# Patient Record
Sex: FEMALE | Race: WHITE | Employment: FULL TIME | URBAN - METROPOLITAN AREA
[De-identification: names, ages, dates, MRNs, and addresses within clinical notes are randomized per-mention and may not be internally consistent; named-entity substitution may affect disease eponyms.]

---

## 2017-08-11 ENCOUNTER — OFFICE VISIT (OUTPATIENT)
Dept: INTERNAL MEDICINE CLINIC | Age: 32
End: 2017-08-11

## 2017-08-11 VITALS
RESPIRATION RATE: 18 BRPM | DIASTOLIC BLOOD PRESSURE: 76 MMHG | SYSTOLIC BLOOD PRESSURE: 116 MMHG | BODY MASS INDEX: 26.1 KG/M2 | OXYGEN SATURATION: 100 % | WEIGHT: 176.2 LBS | HEIGHT: 69 IN | TEMPERATURE: 98.2 F | HEART RATE: 83 BPM

## 2017-08-11 DIAGNOSIS — R05.3 CHRONIC COUGH: Primary | ICD-10-CM

## 2017-08-11 DIAGNOSIS — J30.9 ALLERGIC RHINITIS, UNSPECIFIED ALLERGIC RHINITIS TRIGGER, UNSPECIFIED RHINITIS SEASONALITY: ICD-10-CM

## 2017-08-11 DIAGNOSIS — J45.909 ASTHMA, CURRENTLY INACTIVE: ICD-10-CM

## 2017-08-11 RX ORDER — BENZONATATE 200 MG/1
200 CAPSULE ORAL
Qty: 30 CAP | Refills: 1 | Status: SHIPPED | OUTPATIENT
Start: 2017-08-11 | End: 2020-07-06

## 2017-08-11 RX ORDER — ALBUTEROL SULFATE 90 UG/1
AEROSOL, METERED RESPIRATORY (INHALATION)
Qty: 8.5 INHALER | Refills: 2 | Status: SHIPPED | OUTPATIENT
Start: 2017-08-11 | End: 2017-10-01 | Stop reason: SDUPTHER

## 2017-08-11 RX ORDER — PREDNISONE 20 MG/1
40 TABLET ORAL DAILY
Qty: 10 TAB | Refills: 0 | Status: SHIPPED | OUTPATIENT
Start: 2017-08-11 | End: 2020-07-06

## 2017-08-11 RX ORDER — FLUTICASONE PROPIONATE 50 MCG
2 SPRAY, SUSPENSION (ML) NASAL DAILY
Qty: 3 BOTTLE | Refills: 1 | Status: SHIPPED | OUTPATIENT
Start: 2017-08-11 | End: 2020-07-06

## 2017-08-11 RX ORDER — FLUTICASONE PROPIONATE AND SALMETEROL 250; 50 UG/1; UG/1
1 POWDER RESPIRATORY (INHALATION) 2 TIMES DAILY
Qty: 1 INHALER | Refills: 5 | Status: SHIPPED | OUTPATIENT
Start: 2017-08-11 | End: 2017-08-22 | Stop reason: CLARIF

## 2017-08-11 RX ORDER — AZITHROMYCIN 250 MG/1
TABLET, FILM COATED ORAL
Qty: 6 TAB | Refills: 0 | Status: SHIPPED | OUTPATIENT
Start: 2017-08-11 | End: 2017-08-16

## 2017-08-11 RX ORDER — AZELASTINE 1 MG/ML
2 SPRAY, METERED NASAL 2 TIMES DAILY
Qty: 3 BOTTLE | Refills: 1 | Status: SHIPPED | OUTPATIENT
Start: 2017-08-11 | End: 2020-07-06

## 2017-08-11 NOTE — PROGRESS NOTES
HISTORY OF PRESENT ILLNESS  Cici Diggs is a 29 y.o. female. HPI  Presents for f/u chronic cough    Friend moved in with a cat  Known cat allergy    ?mold in the home as well    Recurrent cough  +spells  +clear drainage     Using astelin and allegra    Intermittent use of albuterol - at least daily    Past medical, Social, and Family history reviewed  Medications reviewed and updated. ROS  Complete ROS reviewed and negative or stable except as noted in HPI. Physical Exam   Constitutional: She is oriented to person, place, and time. She appears well-nourished. No distress. HENT:   Head: Normocephalic and atraumatic. Audible congestion   Eyes: EOM are normal. Pupils are equal, round, and reactive to light. No scleral icterus. Neck: Normal range of motion. Neck supple. Cardiovascular: Normal rate, regular rhythm and normal heart sounds. Exam reveals no gallop and no friction rub. No murmur heard. Pulmonary/Chest: Effort normal. No respiratory distress. She has wheezes (scattered). She has no rales. Abdominal: Soft. She exhibits no distension. There is no tenderness. Musculoskeletal: Normal range of motion. She exhibits no edema. Neurological: She is alert and oriented to person, place, and time. She exhibits normal muscle tone. Skin: Skin is warm. No rash noted. Psychiatric: She has a normal mood and affect. Nursing note and vitals reviewed. Prior labs reviewed. ASSESSMENT and PLAN    ICD-10-CM ICD-9-CM    1. Chronic cough R05 786.2 azithromycin (ZITHROMAX) 250 mg tablet      benzonatate (TESSALON) 200 mg capsule   2. Asthma, currently inactive J45.909 493.90 predniSONE (DELTASONE) 20 mg tablet      albuterol (VENTOLIN HFA) 90 mcg/actuation inhaler      DISCONTINUED: fluticasone-salmeterol (ADVAIR) 250-50 mcg/dose diskus inhaler   3.  Allergic rhinitis, unspecified allergic rhinitis trigger, unspecified rhinitis seasonality J30.9 477.9 azelastine (ASTELIN) 137 mcg (0.1 %) nasal spray      fluticasone (FLONASE ALLERGY RELIEF) 50 mcg/actuation nasal spray     Follow-up Disposition:  Return in about 2 months (around 10/11/2017), or if symptoms worsen or fail to improve, for asthma. results and schedule of future studies reviewed with patient  reviewed diet, exercise and weight    reviewed medications and side effects in detail   azithro  pred burst  Resume advair  Encourage flonase  Cont allegra and astelin  Consider resume singulair  Consider remove cat exposure, have mold evaluated.

## 2017-08-11 NOTE — MR AVS SNAPSHOT
Visit Information Date & Time Provider Department Dept. Phone Encounter #  
 8/11/2017  8:45 AM Ijeoma Mead, [address] [place] [address] and Internal Medicine [phone] [id number] Follow-up Instructions Return in about 2 months (around 10/11/2017), or if symptoms worsen or fail to improve, for asthma. Upcoming Health Maintenance Date Due Pneumococcal 19-64 Medium Risk (1 of 1 - PPSV23) 4/21/2006 DTaP/Tdap/Td series (1 - Tdap) 4/21/2008 INFLUENZA AGE 9 TO ADULT 8/1/2017 PAP AKA CERVICAL CYTOLOGY 3/19/2018 Allergies as of 8/11/2017  Review Complete On: 8/11/2017 By: Ijeoma Mead MD  
  
 Severity Noted Reaction Type Reactions Cat Dander  08/11/2017    Cough Percocet [Oxycodone-acetaminophen]  07/06/2009    Nausea and Vomiting Current Immunizations  Reviewed on 8/11/2017 Name Date Influenza Vaccine (Quad) PF 10/31/2016 Influenza Vaccine Split 11/1/2012 Reviewed by Ijeoma Mead MD on 8/11/2017 at  9:45 AM  
You Were Diagnosed With   
  
 Codes Comments Chronic cough    -  Primary ICD-10-CM: F86 ICD-9-CM: 786.2 Asthma, currently inactive     ICD-10-CM: J45.909 ICD-9-CM: 493.90 Allergic rhinitis, unspecified allergic rhinitis trigger, unspecified rhinitis seasonality     ICD-10-CM: J30.9 ICD-9-CM: 477.9 Vitals BP Pulse Temp Resp Height(growth percentile) Weight(growth percentile) 116/76 (BP 1 Location: Right arm, BP Patient Position: Sitting) 83 98.2 °F (36.8 °C) (Oral) 18 5' 8.75\" (1.746 m) 176 lb 3.2 oz (79.9 kg) SpO2 BMI OB Status Smoking Status 100% 26.21 kg/m2 IUD Current Every Day Smoker BMI and BSA Data Body Mass Index Body Surface Area  
 26.21 kg/m 2 1.97 m 2 Preferred Pharmacy Pharmacy Name Phone [id number] [phone] Your Updated Medication List  
  
   
 This list is accurate as of: 8/11/17 10:01 AM.  Always use your most recent med list.  
  
  
  
  
 ADDERALL 30 mg tablet Generic drug:  dextroamphetamine-amphetamine Take 30 mg by mouth two (2) times a day. albuterol 90 mcg/actuation inhaler Commonly known as:  VENTOLIN HFA  
TAKE 2 PUFFS BY INHALATION EVERY FOUR (4) HOURS AS NEEDED FOR WHEEZING OR SHORTNESS OF BREATH.  
  
 azelastine 137 mcg (0.1 %) nasal spray Commonly known as:  ASTELIN  
2 Sprays by Both Nostrils route two (2) times a day. azithromycin 250 mg tablet Commonly known as:  Aelyda Trini Take 2 tablets today, then take 1 tablet daily  
  
 benzonatate 200 mg capsule Commonly known as:  TESSALON Take 1 Cap by mouth three (3) times daily as needed for Cough. clonazePAM 1 mg tablet Commonly known as:  KlonoPIN  
  
 fexofenadine 180 mg tablet Commonly known as:  [place] Chick TAKE 1 TAB BY MOUTH DAILY. fluticasone 50 mcg/actuation nasal spray Commonly known as:  FLONASE ALLERGY RELIEF  
2 Sprays by Both Nostrils route daily. fluticasone-salmeterol 250-50 mcg/dose diskus inhaler Commonly known as:  ADVAIR Take 1 Puff by inhalation two (2) times a day. montelukast 10 mg tablet Commonly known as:  SINGULAIR  
TAKE 1 TABLET BY MOUTH EVERY DAY  
  
 predniSONE 20 mg tablet Commonly known as:  Pema Look Take 2 Tabs by mouth daily. valACYclovir 500 mg tablet Commonly known as:  VALTREX  
TAKE 2 TABLETS EVERY 24 HRS, AS NEEDED FOR OUTBREAKS ORALLY 5 DAYS Prescriptions Sent to Pharmacy Refills  
 predniSONE (DELTASONE) 20 mg tablet 0 Sig: Take 2 Tabs by mouth daily. Class: Normal  
 Pharmacy: [id number]- [place], [facility] Gely Bowers Ph #: [phone] Route: Oral  
 fluticasone-salmeterol (ADVAIR) 250-50 mcg/dose diskus inhaler 5 Sig: Take 1 Puff by inhalation two (2) times a day.   
 Class: Normal  
 [id number] [address] Ph #: [phone] Route: Inhalation  
 azithromycin (ZITHROMAX) 250 mg tablet 0 Sig: Take 2 tablets today, then take 1 tablet daily Class: Normal  
 [id number] Ph #: [phone]  
 albuterol (VENTOLIN HFA) 90 mcg/actuation inhaler 2 Sig: TAKE 2 PUFFS BY INHALATION EVERY FOUR (4) HOURS AS NEEDED FOR WHEEZING OR SHORTNESS OF BREATH. Class: Normal  
 [id number] Ph #: [phone]  
 azelastine (ASTELIN) 137 mcg (0.1 %) nasal spray 1 Si Sprays by Both Nostrils route two (2) times a day. Class: Normal  
 [id number] Ph #: [phone] Route: Both Nostrils  
 fluticasone (FLONASE ALLERGY RELIEF) 50 mcg/actuation nasal spray 1 Si Sprays by Both Nostrils route daily. Class: Normal  
 [id number] Ph #: [phone] Route: Both Nostrils  
 benzonatate (TESSALON) 200 mg capsule 1 Sig: Take 1 Cap by mouth three (3) times daily as needed for Cough. Class: Normal  
 [id number] Ph #: [phone] Route: Oral  
  
Follow-up Instructions Return in about 2 months (around 10/11/2017), or if symptoms worsen or fail to improve, for asthma. Introducing [facility]! Mani Zimmer introduces [employer] patient portal. Now you can access parts of your medical record, email your doctor's office, and request medication refills online. 1. In your internet browser, go to [email or web address] 2. Click on the First Time User? Click Here link in the Sign In box.  You will see the New Member Sign Up page. 3. Enter your TRData Access Code exactly as it appears below. You will not need to use this code after youve completed the sign-up process. If you do not sign up before the expiration date, you must request a new code. · TRData Access Code: [email or web address] Expires: 11/9/2017  8:38 AM 
 
4. Enter the last four digits of your Social Security Number (xxxx) and Date of Birth (mm/dd/yyyy) as indicated and click Submit. You will be taken to the next sign-up page. 5. Create a TRData ID. This will be your TRData login ID and cannot be changed, so think of one that is secure and easy to remember. 6. Create a TRData password. You can change your password at any time. 7. Enter your Password Reset Question and Answer. This can be used at a later time if you forget your password. 8. Enter your e-mail address. You will receive e-mail notification when new information is available in [address] [facility] 9. Click Sign Up. You can now view and download portions of your medical record. 10. Click the Download Summary menu link to download a portable copy of your medical information. If you have questions, please visit the Frequently Asked Questions section of the TRData website. Remember, TRData is NOT to be used for urgent needs. For medical emergencies, dial 911. Now available from your iPhone and Android! Please provide this summary of care documentation to your next provider. Your primary care clinician is listed as [address]. If you have any questions after today's visit, please call [phone].

## 2017-08-11 NOTE — PROGRESS NOTES
Rm 13    Chief Complaint   Patient presents with    Cough     ongoing for 2 months, \"feels like throat closes up\"    Asthma     inhaler check     1. Have you been to the ER, urgent care clinic since your last visit? Hospitalized since your last visit? No    2. Have you seen or consulted any other health care providers outside of the [address] [place] [address] since your last visit? Include any pap smears or colon screening.  No     Health Maintenance Due   Topic Date Due    Pneumococcal 19-64 Medium Risk (1 of 1 - PPSV23) 04/21/2006    DTaP/Tdap/Td series (1 - Tdap) 04/21/2008    INFLUENZA AGE 9 TO ADULT  08/01/2017     PHQ over the last two weeks 8/11/2017   Little interest or pleasure in doing things Not at all   Feeling down, depressed or hopeless Not at all   Total Score PHQ 2 0

## 2017-08-16 ENCOUNTER — TELEPHONE (OUTPATIENT)
Dept: INTERNAL MEDICINE CLINIC | Age: 32
End: 2017-08-16

## 2017-08-16 NOTE — TELEPHONE ENCOUNTER
Pharmacy is calling to check the status on a prior authorization for Advair 250/50 MG.   Thank you,  Kathryn Downey

## 2017-08-21 ENCOUNTER — DOCUMENTATION ONLY (OUTPATIENT)
Dept: INTERNAL MEDICINE CLINIC | Age: 32
End: 2017-08-21

## 2017-08-22 DIAGNOSIS — J45.40 MODERATE PERSISTENT ASTHMA WITHOUT COMPLICATION: Primary | ICD-10-CM

## 2017-09-19 NOTE — TELEPHONE ENCOUNTER
Fax received from [facility] stating advair is no longer covered and pt will need to be prescribed dulera or breo ellipta instead.

## 2017-10-01 DIAGNOSIS — J45.909 ASTHMA, CURRENTLY INACTIVE: ICD-10-CM

## 2017-10-02 RX ORDER — ALBUTEROL SULFATE 90 UG/1
AEROSOL, METERED RESPIRATORY (INHALATION)
Qty: 18 INHALER | Refills: 2 | Status: SHIPPED | OUTPATIENT
Start: 2017-10-02 | End: 2020-06-15 | Stop reason: SDUPTHER

## 2017-11-11 DIAGNOSIS — J30.89 PERENNIAL ALLERGIC RHINITIS: ICD-10-CM

## 2017-11-11 DIAGNOSIS — J45.40 MODERATE PERSISTENT ASTHMA WITHOUT COMPLICATION: ICD-10-CM

## 2017-11-13 RX ORDER — MONTELUKAST SODIUM 10 MG/1
TABLET ORAL
Qty: 90 TAB | Refills: 1 | Status: SHIPPED | OUTPATIENT
Start: 2017-11-13 | End: 2020-07-06

## 2017-11-13 RX ORDER — MINERAL OIL
ENEMA (ML) RECTAL
Qty: 90 TAB | Refills: 1 | Status: SHIPPED | OUTPATIENT
Start: 2017-11-13 | End: 2020-07-06

## 2018-11-19 DIAGNOSIS — J45.40 MODERATE PERSISTENT ASTHMA WITHOUT COMPLICATION: ICD-10-CM

## 2018-11-19 RX ORDER — FLUTICASONE FUROATE 200 UG/1
POWDER RESPIRATORY (INHALATION)
Qty: 1 INHALER | Refills: 1 | Status: SHIPPED | OUTPATIENT
Start: 2018-11-19 | End: 2020-06-15 | Stop reason: SDUPTHER

## 2020-06-15 ENCOUNTER — OFFICE VISIT (OUTPATIENT)
Dept: INTERNAL MEDICINE CLINIC | Age: 35
End: 2020-06-15

## 2020-06-15 DIAGNOSIS — J45.909 ASTHMA, CURRENTLY INACTIVE: ICD-10-CM

## 2020-06-15 DIAGNOSIS — J45.40 MODERATE PERSISTENT ASTHMA WITHOUT COMPLICATION: ICD-10-CM

## 2020-06-15 NOTE — TELEPHONE ENCOUNTER
Pt was scheduled to see  as an IO visit however she got called into work. Pt has re-scheduled to see  on 7/6/20 however the pt is completely out of her rescue inhaler and would like if  could fill it since she is coming in July.

## 2020-06-22 RX ORDER — FLUTICASONE FUROATE 200 UG/1
POWDER RESPIRATORY (INHALATION)
Qty: 1 INHALER | Refills: 1 | Status: SHIPPED | OUTPATIENT
Start: 2020-06-22 | End: 2020-07-06

## 2020-06-22 RX ORDER — ALBUTEROL SULFATE 90 UG/1
AEROSOL, METERED RESPIRATORY (INHALATION)
Qty: 1 INHALER | Refills: 0 | Status: SHIPPED | OUTPATIENT
Start: 2020-06-22 | End: 2020-07-06 | Stop reason: SDUPTHER

## 2020-06-22 RX ORDER — ALBUTEROL SULFATE 90 UG/1
AEROSOL, METERED RESPIRATORY (INHALATION)
Qty: 1 INHALER | Refills: 2 | Status: SHIPPED | OUTPATIENT
Start: 2020-06-22 | End: 2020-06-22 | Stop reason: SDUPTHER

## 2020-06-23 NOTE — TELEPHONE ENCOUNTER
Refill request(s) approved--Arnuity Ellipta inhaler. Also refilled albuterol HFA--sent initially as 1 inhaler with 2 refills, but then re-sent with clarification to pharmacist for no refills. Requested Prescriptions     Signed Prescriptions Disp Refills    fluticasone furoate (Arnuity Ellipta) 200 mcg/actuation dsdv inhaler 1 Inhaler 1     Sig: TAKE 1 PUFF BY INHALATION DAILY. Authorizing Provider: Calos Ayala albuterol (Ventolin HFA) 90 mcg/actuation inhaler 1 Inhaler 0     Sig: TAKE 2 PUFFS BY INHALATION EVERY FOUR (4) HOURS AS NEEDED FOR WHEEZING OR SHORTNESS OF BREATH. Authorizing Provider: Denise Lowery     Note:        Notes to Pharmacy: Replaces prior order--refill inhaler but with no refills--needs follow-up appt.         Future Appointments   Date Time Provider Emerson Fuchs   7/6/2020  3:30 PM Karen Forrest MD [address] [facility]

## 2020-06-29 ENCOUNTER — TELEPHONE (OUTPATIENT)
Dept: INTERNAL MEDICINE CLINIC | Age: 35
End: 2020-06-29

## 2020-06-29 DIAGNOSIS — J45.40 MODERATE PERSISTENT ASTHMA WITHOUT COMPLICATION: ICD-10-CM

## 2020-06-29 RX ORDER — FLUTICASONE FUROATE 200 UG/1
POWDER RESPIRATORY (INHALATION)
Qty: 1 INHALER | Refills: 1 | Status: CANCELLED | OUTPATIENT
Start: 2020-06-29

## 2020-06-29 NOTE — TELEPHONE ENCOUNTER
Pharmacy recommends Flovent HFA inhalet as an alternative or would you like to start PA for Arnuity inhaler. Please advise.

## 2020-06-29 NOTE — TELEPHONE ENCOUNTER
Please route Prior Authorization. Thank you. Medication Arnuity Ellipta 200 mcg needs a Prior Authorization or an alternative medication. Please review. Last visit 08/11/2017 MD Leah Su   Next appointment 07/06/2020 MD Leah Su       Requested Prescriptions     Pending Prescriptions Disp Refills    fluticasone furoate (Arnuity Ellipta) 200 mcg/actuation dsdv inhaler 1 Inhaler 1     Sig: TAKE 1 PUFF BY INHALATION DAILY.

## 2020-07-06 ENCOUNTER — OFFICE VISIT (OUTPATIENT)
Dept: INTERNAL MEDICINE CLINIC | Age: 35
End: 2020-07-06

## 2020-07-06 VITALS
HEIGHT: 69 IN | TEMPERATURE: 98.9 F | BODY MASS INDEX: 28.16 KG/M2 | OXYGEN SATURATION: 97 % | RESPIRATION RATE: 15 BRPM | WEIGHT: 190.13 LBS | HEART RATE: 108 BPM | SYSTOLIC BLOOD PRESSURE: 145 MMHG | DIASTOLIC BLOOD PRESSURE: 79 MMHG

## 2020-07-06 DIAGNOSIS — F90.9 ATTENTION DEFICIT HYPERACTIVITY DISORDER (ADHD), UNSPECIFIED ADHD TYPE: ICD-10-CM

## 2020-07-06 DIAGNOSIS — J45.40 MODERATE PERSISTENT ASTHMA WITHOUT COMPLICATION: Primary | ICD-10-CM

## 2020-07-06 RX ORDER — ALBUTEROL SULFATE 90 UG/1
AEROSOL, METERED RESPIRATORY (INHALATION)
Qty: 1 INHALER | Refills: 2 | Status: SHIPPED | OUTPATIENT
Start: 2020-07-06 | End: 2020-07-06 | Stop reason: SDUPTHER

## 2020-07-06 RX ORDER — ALBUTEROL SULFATE 90 UG/1
AEROSOL, METERED RESPIRATORY (INHALATION)
Qty: 1 INHALER | Refills: 2 | Status: SHIPPED | OUTPATIENT
Start: 2020-07-06 | End: 2020-10-11

## 2020-07-06 RX ORDER — BUDESONIDE AND FORMOTEROL FUMARATE DIHYDRATE 160; 4.5 UG/1; UG/1
2 AEROSOL RESPIRATORY (INHALATION)
COMMUNITY
End: 2020-07-06 | Stop reason: SDUPTHER

## 2020-07-06 RX ORDER — BUDESONIDE AND FORMOTEROL FUMARATE DIHYDRATE 160; 4.5 UG/1; UG/1
2 AEROSOL RESPIRATORY (INHALATION) 2 TIMES DAILY
Qty: 1 INHALER | Refills: 5 | Status: SHIPPED | OUTPATIENT
Start: 2020-07-06 | End: 2020-12-12

## 2020-07-06 NOTE — PROGRESS NOTES
History of Present Illness:   Sarah Palencia is a 37 y.o. female here for evaluation:    Chief Complaint   Patient presents with    Medication Evaluation     Patient request Albuterol inhaler, last prescriber no longer practices. Patient believes she was taking a different Albuterol than what she recieved from pharmacy. Reports current Albuterol inhaler does not seem as effective. Notes (nursing/rooming note copied below in italics):  None    Asked to schedule visit due to frequent albuterol refills. She had albuterol refills on 6/22 as 1 inhaler with 2 refills, separate 6/22 script for 1 inhaler with no refills. Last fill here prior was 2017. She notes has only had 1 inhaler since last requested. She has used albuterol as branded medication and this is covered with her insurance. [facility] who is also a NP in primary care. She had used Symbicort and seen in ED Jan 2019. Thinks medication was prescribed there with spacer. This caused improvement in medication--both with technique and spacer. She still uses spacer. Her NP has stopped managing her pulmonary medications and she needs us to manage those again. Reviewed routine 6mo follow-up as either problem-only or physical.  She has routine health screenings through gynecology. 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 Nursing screenings reviewed by provider at visit. Prior to Admission medications    Medication Sig Start Date End Date Taking? Authorizing Provider   levonorgestreL (MIRENA) 20 mcg/24 hours (5 yrs) 52 mg IUD 1 Device by IntraUTERine route once. Yes Provider, Historical   budesonide-formoteroL (Symbicort) 160-4.5 mcg/actuation HFAA Take 2 Puffs by inhalation. Yes Provider, Historical   albuterol (Ventolin HFA) 90 mcg/actuation inhaler TAKE 2 PUFFS BY INHALATION EVERY FOUR (4) HOURS AS NEEDED FOR WHEEZING OR SHORTNESS OF BREATH. 6/22/20  Yes Elio Gardner MD   valACYclovir (VALTREX) 500 mg tablet TAKE 2 TABLETS EVERY 24 HRS, AS NEEDED FOR OUTBREAKS ORALLY 5 DAYS 10/11/16  Yes Provider, Historical   clonazePAM (KLONOPIN) 1 mg tablet  3/10/15  Yes Provider, Historical   amphetamine-dextroamphetamine (ADDERALL) 30 mg tablet Take 30 mg by mouth three (3) times daily. 1/10/11  Yes Provider, Historical   fluticasone furoate (Arnuity Ellipta) 200 mcg/actuation dsdv inhaler TAKE 1 PUFF BY INHALATION DAILY. Patient not taking: Reported on 7/6/2020 6/22/20   Elio Gardner MD   fexofenadine (ALLEGRA) 180 mg tablet TAKE 1 TAB BY MOUTH DAILY. Patient not taking: Reported on 7/6/2020 11/13/17   Elio Gardner MD   montelukast (SINGULAIR) 10 mg tablet TAKE 1 TABLET BY MOUTH EVERY DAY  Patient not taking: Reported on 7/6/2020 11/13/17   Elio Gardner MD   predniSONE (DELTASONE) 20 mg tablet Take 2 Tabs by mouth daily. Patient not taking: Reported on 7/6/2020 8/11/17   Cruzito Soto MD   azelastine (ASTELIN) 137 mcg (0.1 %) nasal spray 2 Sprays by Both Nostrils route two (2) times a day. Patient not taking: Reported on 7/6/2020 8/11/17   Cruzito Soto MD   fluticasone Radha Blander ALLERGY RELIEF) 50 mcg/actuation nasal spray 2 Sprays by Both Nostrils route daily. Patient not taking: Reported on 7/6/2020 8/11/17   Cruzito Soto MD   benzonatate (TESSALON) 200 mg capsule Take 1 Cap by mouth three (3) times daily as needed for Cough. Patient not taking: Reported on 7/6/2020 8/11/17   Cruzito Soto MD        ROS    Vitals:    07/06/20 1534   BP: 145/79   Pulse: (!) 108   Resp: 15   Temp: 98.9 °F (37.2 °C)   TempSrc: Oral   SpO2: 97%   Weight: 190 lb 2 oz (86.2 kg)   Height: 5' 8.75\" (1.746 m)   PainSc:   0 - No pain      Body mass index is 28.28 kg/m². Physical Exam:     Physical Exam  Vitals signs and nursing note reviewed. Constitutional:       General: She is not in acute distress.      Appearance: Normal appearance. She is well-developed. She is not diaphoretic. HENT:      Head: Normocephalic and atraumatic. Mouth/Throat:      Mouth: Mucous membranes are moist.   Eyes:      General: No scleral icterus. Right eye: No discharge. Left eye: No discharge. Conjunctiva/sclera: Conjunctivae normal.   Cardiovascular:      Rate and Rhythm: Normal rate and regular rhythm. Pulses: Normal pulses. Heart sounds: Normal heart sounds. No murmur. No friction rub. No gallop. Pulmonary:      Effort: Pulmonary effort is normal. No respiratory distress. Breath sounds: Normal breath sounds. No stridor. No wheezing or rhonchi. Abdominal:      General: Bowel sounds are normal.      Palpations: Abdomen is soft. Tenderness: There is no abdominal tenderness. Musculoskeletal:         General: No deformity or signs of injury. Skin:     General: Skin is warm. Coloration: Skin is not jaundiced or pale. Findings: No bruising, erythema or rash. Neurological:      General: No focal deficit present. Mental Status: She is alert. Motor: No abnormal muscle tone. Coordination: Coordination normal.      Gait: Gait normal.   Psychiatric:         Mood and Affect: Mood normal.         Behavior: Behavior normal.         Thought Content: Thought content normal.         Judgment: Judgment normal.         Assessment and Plan:       ICD-10-CM ICD-9-CM    1. Moderate persistent asthma without complication I15.24 413.51 budesonide-formoteroL (Symbicort) 160-4.5 mcg/actuation HFAA      inhalational spacing device      inhalational spacing device      Ventolin HFA 90 mcg/actuation inhaler         2. Attention deficit hyperactivity disorder (ADHD), unspecified ADHD type F90.9 314.01        1. Refills reviewed. Albuterol sent as requested/reviewed at visit. 2.  Reviewed--managing with another provider.       Follow-up and Dispositions    · Return in about 6 months (around 1/6/2021) for asthma/medication follow-up.       lab results and schedule of future lab studies reviewed with patient  reviewed medications and side effects in detail    Plan and evaluation (above) reviewed with pt at visit  Patient voiced understanding of plan and provided with time to ask/review questions. After Visit Summary (AVS) provided to pt after visit with additional instructions as needed/reviewed. No future appointments.

## 2020-07-06 NOTE — PROGRESS NOTES
RM 16    Chief Complaint   Patient presents with    Medication Evaluation     Patient needs Albuterol inhaler, Last prescriber does not practive any longer. PAtient belives she was taking a different Albuterol than what she recieved from pharmacy,      1. Have you been to the ER, urgent care clinic since your last visit? Hospitalized since your last visit? No    2. Have you seen or consulted any other health care providers outside of the [address] [place] [address] since your last visit? Include any pap smears or colon screening. No    Health Maintenance Due   Topic Date Due    Pneumococcal 0-64 years (1 of 1 - PPSV23) 04/21/1993    DTaP/Tdap/Td series (1 - Tdap) 04/21/2008    PAP AKA CERVICAL CYTOLOGY  03/19/2018     No flowsheet data found.     3 most recent PHQ Screens 8/11/2017   Little interest or pleasure in doing things Not at all   Feeling down, depressed, irritable, or hopeless Not at all   Total Score PHQ 2 0     Learning Assessment 8/11/2017   PRIMARY LEARNER Patient   HIGHEST LEVEL OF EDUCATION - PRIMARY LEARNER  [id number] [address]att PRIMARY LEARNER NONE   CO-LEARNER CAREGIVER -   PRIMARY LANGUAGE ENGLISH    NEED -   LEARNER PREFERENCE PRIMARY DEMONSTRATION   LEARNING SPECIAL TOPICS -   ANSWERED BY patient   RELATIONSHIP SELF

## 2020-10-10 DIAGNOSIS — J45.40 MODERATE PERSISTENT ASTHMA WITHOUT COMPLICATION: ICD-10-CM

## 2020-10-11 RX ORDER — ALBUTEROL SULFATE 90 UG/1
AEROSOL, METERED RESPIRATORY (INHALATION)
Qty: 18 INHALER | Refills: 2 | Status: SHIPPED | OUTPATIENT
Start: 2020-10-11 | End: 2020-12-21

## 2020-12-07 DIAGNOSIS — J45.40 MODERATE PERSISTENT ASTHMA WITHOUT COMPLICATION: ICD-10-CM

## 2020-12-12 RX ORDER — BUDESONIDE AND FORMOTEROL FUMARATE DIHYDRATE 160; 4.5 UG/1; UG/1
AEROSOL RESPIRATORY (INHALATION)
Qty: 30.6 INHALER | Refills: 5 | Status: SHIPPED | OUTPATIENT
Start: 2020-12-12

## 2020-12-14 DIAGNOSIS — J45.40 MODERATE PERSISTENT ASTHMA WITHOUT COMPLICATION: ICD-10-CM

## 2020-12-21 RX ORDER — ALBUTEROL SULFATE 90 UG/1
AEROSOL, METERED RESPIRATORY (INHALATION)
Qty: 18 INHALER | Refills: 2 | Status: SHIPPED | OUTPATIENT
Start: 2020-12-21 | End: 2021-03-08

## 2020-12-21 NOTE — TELEPHONE ENCOUNTER
Refill request(s) approved--albuterol HFA. Refill protocol details (computer-generated) reviewed, as available.

## 2021-03-08 DIAGNOSIS — J45.40 MODERATE PERSISTENT ASTHMA WITHOUT COMPLICATION: ICD-10-CM

## 2021-03-08 NOTE — TELEPHONE ENCOUNTER
Last visit 07/06/2020 MD Marina Nicole   Next appointment 6 months (01/2021)   Previous refill encounter(s)   12/21/2020 Ventolin HFA #18 grams with 2 refills       Requested Prescriptions     Pending Prescriptions Disp Refills    Ventolin HFA 90 mcg/actuation inhaler [Pharmacy Med Name: VENTOLIN HFA 90 MCG INHALER] 18 Inhaler 1     Sig: Take 2 Puffs by inhalation every four (4) hours as needed for Wheezing or Shortness of Breath.

## 2021-03-13 RX ORDER — ALBUTEROL SULFATE 90 UG/1
2 AEROSOL, METERED RESPIRATORY (INHALATION)
Qty: 18 INHALER | Refills: 2 | Status: SHIPPED | OUTPATIENT
Start: 2021-03-13 | End: 2021-06-06

## 2021-05-23 DIAGNOSIS — J45.40 MODERATE PERSISTENT ASTHMA WITHOUT COMPLICATION: ICD-10-CM

## 2021-06-06 RX ORDER — ALBUTEROL SULFATE 90 UG/1
AEROSOL, METERED RESPIRATORY (INHALATION)
Qty: 18 G | Refills: 2 | Status: SHIPPED | OUTPATIENT
Start: 2021-06-06 | End: 2021-09-10

## 2021-09-10 DIAGNOSIS — J45.40 MODERATE PERSISTENT ASTHMA WITHOUT COMPLICATION: ICD-10-CM

## 2021-09-10 RX ORDER — ALBUTEROL SULFATE 90 UG/1
AEROSOL, METERED RESPIRATORY (INHALATION)
Qty: 18 EACH | Refills: 0 | Status: SHIPPED | OUTPATIENT
Start: 2021-09-10

## 2021-09-10 NOTE — TELEPHONE ENCOUNTER
Refill request(s) approved--albuterol HFA--1 inhaler with no refills. LOV July 2020. Letter to pt to schedule follow-up.

## 2021-09-10 NOTE — LETTER
9/10/2021 5:22 PM        Ms. Justine Gonzalez  [address] [address]                Dear Ms. Jayce Unger:    Roni Fung missed you! Please call our office at [phone] and schedule a follow up appointment for your continued care.         Sincerely,      Sara Costa MD

## 2021-10-07 DIAGNOSIS — J45.40 MODERATE PERSISTENT ASTHMA WITHOUT COMPLICATION: ICD-10-CM

## 2021-10-07 RX ORDER — ALBUTEROL SULFATE 90 UG/1
AEROSOL, METERED RESPIRATORY (INHALATION)
Qty: 18 EACH | Refills: 0 | OUTPATIENT
Start: 2021-10-07

## 2021-10-07 NOTE — TELEPHONE ENCOUNTER
Albuterol last refilled Sept 2021. LOV July 2020. Albuterol HFA refill not approved at this time. Needs appt. Letter to pt to schedule follow-up.

## 2021-10-07 NOTE — LETTER
10/7/2021 8:16 AM        Ms. Aydee Mcbride  [address] [facility] [address]                Dear Ms. Edwinbryan Zamarripa:    Ruperto Phan missed you! Please call our office at [phone] and schedule a follow up appointment for your continued care.       Sincerely,    Reno Cohen MD

## 2022-02-27 DIAGNOSIS — J45.40 MODERATE PERSISTENT ASTHMA WITHOUT COMPLICATION: ICD-10-CM

## 2022-03-05 RX ORDER — BUDESONIDE AND FORMOTEROL FUMARATE DIHYDRATE 160; 4.5 UG/1; UG/1
AEROSOL RESPIRATORY (INHALATION)
Qty: 30.6 EACH | Refills: 6 | OUTPATIENT
Start: 2022-03-05

## 2022-03-05 NOTE — TELEPHONE ENCOUNTER
Symbicort inhaler not approved at this time.     LOV July 2020.     No future appointments.     Has not responded to letters to schedule follow-up appt.     Please contact patient to schedule a follow-up appt. MyChart message to pt--to schedule follow-up appt.

## 2022-07-18 NOTE — PROGRESS NOTES
HPI: Ramesh Ojeda (: 1985) is a 30 y.o. female, patient, here for evaluation of the following chief complaint(s): Patient presents with complaint of severe left wrist pain at the radial aspect with radiation to the thumb. She denies tingling and numbness in the hand. 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 She accessed a Velcro strap wrist splint yesterday, however it does not isolate the thumb. She denies injury/trauma. No other complaints or concerns. X-rays of the right wrist obtained today. Wrist Pain (Left)       Vitals:  Ht 5' 7\" (1.702 m)   Wt 140 lb (63.5 kg)   BMI 21.93 kg/m²    Body mass index is 21.93 kg/m². Allergies   Allergen Reactions    Cat Dander Cough    Percocet [Oxycodone-Acetaminophen] Nausea and Vomiting       Current Outpatient Medications   Medication Sig    Ventolin HFA 90 mcg/actuation inhaler TAKE 2 PUFFS BY INHALATION EVERY FOUR HOURS AS NEEDED FOR WHEEZING OR SHORTNESS OF BREATH.  Symbicort 160-4.5 mcg/actuation HFAA TAKE 2 PUFFS BY INHALATION TWO (2) TIMES A DAY.  levonorgestreL (MIRENA) 20 mcg/24 hours (5 yrs) 52 mg IUD 1 Device by IntraUTERine route once.  inhalational spacing device 1 Each by Does Not Apply route as needed (as directed).  inhalational spacing device 1 Each by Does Not Apply route as needed (as directed).  valACYclovir (VALTREX) 500 mg tablet TAKE 2 TABLETS EVERY 24 HRS, AS NEEDED FOR OUTBREAKS ORALLY 5 DAYS    clonazePAM (KLONOPIN) 1 mg tablet     amphetamine-dextroamphetamine (ADDERALL) 30 mg tablet Take 30 mg by mouth three (3) times daily.      Current Facility-Administered Medications   Medication    bupivacaine (PF) (MARCAINE) 0.75 % (7.5 mg/mL) injection 7.5 mg    triamcinolone acetonide (KENALOG-40) 40 mg/mL injection 40 mg       Past Medical History:   Diagnosis Date    ADHD (attention deficit hyperactivity disorder)     diagnosed in [facility]    Asthma     Gynecological examination     [facility]- Dr. Kenisha Alvarez    Preeclampsia         Past Surgical History:   Procedure Laterality Date    HX  SECTION  2007       Family History   Problem Relation Age of Onset    Stroke Maternal Grandfather     Diabetes Paternal Grandmother         Social History     Tobacco Use    Smoking status: Light Tobacco Smoker     Packs/day: 0.25     Years: 5.00     Pack years: 1.25    Smokeless tobacco: Never Used    Tobacco comment: information given with AVS   Substance Use Topics    Alcohol use: No    Drug use: No        Review of Systems    Constitutional: No fevers, chills, night sweats, excessive fatigue or weight loss. Musculoskeletal: No joint pain, swelling or redness. No decreased range of motion. Neurologic: No headache, blurred vision, and no areas of focal weakness or numbness. Normal gait. No sensory problems. Respiratory: No dyspnea on exertion, orthopnea, chest pain, cough or hemoptysis. Cardiovascular: No anginal chest pain, irregular heart beat, tachycardia, palpitations or orthopnea  Integumentary: No chronic rashes, inflammation, ulcerations, pruritus, petechiae, purpura, ecchymoses, or skin changes      Physical Exam    General: Alert, cooperative, no distress  Musculosketal: Left wrist - Normal range of motion. Normal sensation. Tenderness to palpation at the radial aspect of the wrist.  Positive Finklestein's test.  Mild swelling over the EPB tendon. No cysts. Neurologic:  CNII-XII intact, Normal strength, sensation, and reflexes throughout    XR Results (most recent):  Results from Appointment encounter on 22    XR WRIST LT AP/LAT/OBL MIN 3V    Narrative  Normal osseous and joint space findings. No fracture, deformity or erosions appreciated. ASSESSMENT/PLAN:  Below is the assessment and plan developed based on review of pertinent history, physical exam, labs, studies, and medications.     Severe left wrist pain at the radial aspect with radiation to the thumb. She denies tingling and numbness in the hand. 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 She accessed a Velcro strap wrist splint yesterday, however it does not isolate the thumb. Clinical exam is consistent with left wrist De Quervain's tenosynovitis. Options reviewed with the patient. Given her level of pain, she is opting for a therapeutic injection. Therapeutic injection administered 7/19/2022. She will access a thumb spica Velcro strap wrist splint for comfort and support. Home exercises provided. She will follow-up in the office in 3 to 4 weeks to review her progress. Paperwork completed for unemployment. Patient consent obtained prior to the injection(s). Consent forms signed. Discussed risks/benefits of cortisone injection and patient gave verbal consent. Under sterile conditions, the first dorsal compartment of the left wrist was injected with 1 cc 0.75% Bupivacaine and 1cc 40mg Triamcinolone, tolerated the procedure well. 1. Left wrist pain  -     XR WRIST LT AP/LAT/OBL MIN 3V; Future  2. De Quervain's tenosynovitis, left  -     INJECT TENDON SHEATH/LIGAMENT  -     bupivacaine (PF) (MARCAINE) 0.75 % (7.5 mg/mL) injection 7.5 mg; 7.5 mg, SubCUTAneous, ONCE, 1 dose, On Tue 7/19/22 at 1700  -     triamcinolone acetonide (KENALOG-40) 40 mg/mL injection 40 mg; 40 mg, Other, ONCE, 1 dose, On Tue 7/19/22 at 1700      Return in about 4 weeks (around 8/16/2022). Dr. Maria E Clifton was available for immediate consult during this encounter. An electronic signature was used to authenticate this note.   -- Fabi Swanson PA-C

## 2022-07-19 ENCOUNTER — OFFICE VISIT (OUTPATIENT)
Dept: ORTHOPEDIC SURGERY | Age: 37
End: 2022-07-19
Payer: MEDICAID

## 2022-07-19 VITALS — WEIGHT: 140 LBS | BODY MASS INDEX: 21.97 KG/M2 | HEIGHT: 67 IN

## 2022-07-19 DIAGNOSIS — M25.532 LEFT WRIST PAIN: Primary | ICD-10-CM

## 2022-07-19 DIAGNOSIS — M65.4 DE QUERVAIN'S TENOSYNOVITIS, LEFT: ICD-10-CM

## 2022-07-19 PROCEDURE — 99203 OFFICE O/P NEW LOW 30 MIN: CPT | Performed by: PHYSICIAN ASSISTANT

## 2022-07-19 PROCEDURE — 20550 NJX 1 TENDON SHEATH/LIGAMENT: CPT | Performed by: PHYSICIAN ASSISTANT

## 2022-07-19 RX ORDER — BUPIVACAINE HYDROCHLORIDE 7.5 MG/ML
7.5 INJECTION, SOLUTION EPIDURAL; RETROBULBAR ONCE
Status: COMPLETED | OUTPATIENT
Start: 2022-07-19 | End: 2022-07-19

## 2022-07-19 RX ORDER — TRIAMCINOLONE ACETONIDE 40 MG/ML
40 INJECTION, SUSPENSION INTRA-ARTICULAR; INTRAMUSCULAR ONCE
Status: COMPLETED | OUTPATIENT
Start: 2022-07-19 | End: 2022-07-19

## 2022-07-19 RX ADMIN — TRIAMCINOLONE ACETONIDE 40 MG: 40 INJECTION, SUSPENSION INTRA-ARTICULAR; INTRAMUSCULAR at 16:08

## 2022-07-19 RX ADMIN — BUPIVACAINE HYDROCHLORIDE 7.5 MG: 7.5 INJECTION, SOLUTION EPIDURAL; RETROBULBAR at 16:07

## 2023-11-05 ENCOUNTER — HOSPITAL ENCOUNTER (EMERGENCY)
Facility: HOSPITAL | Age: 38
Discharge: HOME | End: 2023-11-05
Attending: STUDENT IN AN ORGANIZED HEALTH CARE EDUCATION/TRAINING PROGRAM
Payer: MEDICAID

## 2023-11-05 VITALS
WEIGHT: 140 LBS | OXYGEN SATURATION: 100 % | TEMPERATURE: 97.9 F | SYSTOLIC BLOOD PRESSURE: 117 MMHG | HEART RATE: 92 BPM | RESPIRATION RATE: 18 BRPM | HEIGHT: 67 IN | BODY MASS INDEX: 21.97 KG/M2 | DIASTOLIC BLOOD PRESSURE: 77 MMHG

## 2023-11-05 DIAGNOSIS — J45.20 MILD INTERMITTENT ASTHMA WITHOUT COMPLICATION: Primary | ICD-10-CM

## 2023-11-05 PROCEDURE — 94640 AIRWAY INHALATION TREATMENT: CPT

## 2023-11-05 PROCEDURE — 6370000000 HC RX 637 (ALT 250 FOR IP): Performed by: STUDENT IN AN ORGANIZED HEALTH CARE EDUCATION/TRAINING PROGRAM

## 2023-11-05 PROCEDURE — 99283 EMERGENCY DEPT VISIT LOW MDM: CPT

## 2023-11-05 RX ORDER — BUDESONIDE AND FORMOTEROL FUMARATE DIHYDRATE 160; 4.5 UG/1; UG/1
AEROSOL RESPIRATORY (INHALATION)
Qty: 10.2 G | Refills: 0 | Status: SHIPPED | OUTPATIENT
Start: 2023-11-05

## 2023-11-05 RX ORDER — IPRATROPIUM BROMIDE AND ALBUTEROL SULFATE 2.5; .5 MG/3ML; MG/3ML
1 SOLUTION RESPIRATORY (INHALATION)
Status: COMPLETED | OUTPATIENT
Start: 2023-11-05 | End: 2023-11-05

## 2023-11-05 RX ORDER — ALBUTEROL SULFATE 90 UG/1
AEROSOL, METERED RESPIRATORY (INHALATION)
Qty: 18 G | Refills: 0 | Status: SHIPPED | OUTPATIENT
Start: 2023-11-05

## 2023-11-05 RX ADMIN — IPRATROPIUM BROMIDE AND ALBUTEROL SULFATE 1 DOSE: .5; 3 SOLUTION RESPIRATORY (INHALATION) at 08:27

## 2023-11-05 ASSESSMENT — PAIN - FUNCTIONAL ASSESSMENT: PAIN_FUNCTIONAL_ASSESSMENT: NONE - DENIES PAIN

## 2023-11-05 NOTE — ED NOTES
Patient (s)  given copy of dc instructions and 2 script(s). Patient (s)  verbalized understanding of instructions and script (s). Patient given a current medication reconciliation form and verbalized understanding of their medications. Patient (s) verbalized understanding of the importance of discussing medications with his or her physician or clinic they will be following up with. Patient alert and oriented and in no acute distress. Patient discharged home, patient offered wheelchair, patient declines wheelchair.          Mynor Frederick, [address] [place] [address]  11/05/23 1423

## 2023-11-05 NOTE — ED NOTES
Patient duo neb finished, patient reports she feels better. Patient states that thew frequency of her SOB has increased, states that she realized that before she was in detention she was on Symbicort before and she noticed that not having that anymore seems to have a correlation to her asthma attack frequency. Patient denies fevers, denies known contact with anyone with known illness, however she states she is currently in a recovery house with 10 other women and so does have more people near here.                   Padmaja Rivero  11/05/23 [id number]

## 2023-11-05 NOTE — ED PROVIDER NOTES
[facility] EMERGENCY DEPT  EMERGENCY DEPARTMENT ENCOUNTER       Pt Name: Saumya Rivas  MRN: [id number]  [address] 1985  Date of evaluation: 2023  Provider: Ted Castillo MD   PCP: Catracho Flores MD  Note Started: 8:14 AM EST 23     CHIEF COMPLAINT       Chief Complaint   Patient presents with    Wheezing        HISTORY OF PRESENT ILLNESS: 1 or more elements      History From: patient, History limited by: none     Saumya Rivas is a 41 y.o. female presenting with wheezing. She notes she ran out of her albuterol inhaler. She notes she has had a mild cough. No fever or chest pain. Notes this is her standard daily wheeze and does not feel like it is worse she just ran out of her inhaler. Please See MDM for Additional Details of the HPI/PMH  Nursing Notes were all reviewed and agreed with or any disagreements were addressed in the HPI. REVIEW OF SYSTEMS        Positives and Pertinent negatives as per HPI. PAST HISTORY     Past Medical History:  Past Medical History:   Diagnosis Date    ADHD (attention deficit hyperactivity disorder)     diagnosed in [facility]    Asthma     Gynecological examination     [facility]- Dr. Aleisha Carson    Preeclampsia        Past Surgical History:  Past Surgical History:   Procedure Laterality Date     SECTION         Family History:  Family History   Problem Relation Age of Onset    Diabetes Paternal Grandmother     Stroke Maternal Grandfather        Social History:  Social History     Tobacco Use    Smoking status: Light Smoker     Packs/day: .25     Types: Cigarettes    Smokeless tobacco: Never    Tobacco comments:     Quit smoking: information given with AVS   Substance Use Topics    Alcohol use: No    Drug use: No       Allergies:   Allergies   Allergen Reactions    Cat Hair Extract Cough    Oxycodone-Acetaminophen Nausea And Vomiting       CURRENT MEDICATIONS      Current Discharge Medication List        CONTINUE these medications which